# Patient Record
Sex: FEMALE | Race: BLACK OR AFRICAN AMERICAN | NOT HISPANIC OR LATINO | Employment: UNEMPLOYED | URBAN - METROPOLITAN AREA
[De-identification: names, ages, dates, MRNs, and addresses within clinical notes are randomized per-mention and may not be internally consistent; named-entity substitution may affect disease eponyms.]

---

## 2019-01-22 ENCOUNTER — HISTORICAL (OUTPATIENT)
Dept: ADMINISTRATIVE | Facility: HOSPITAL | Age: 54
End: 2019-01-22

## 2022-04-10 ENCOUNTER — HISTORICAL (OUTPATIENT)
Dept: ADMINISTRATIVE | Facility: HOSPITAL | Age: 57
End: 2022-04-10

## 2022-04-28 VITALS
SYSTOLIC BLOOD PRESSURE: 119 MMHG | WEIGHT: 259.94 LBS | BODY MASS INDEX: 47.84 KG/M2 | DIASTOLIC BLOOD PRESSURE: 80 MMHG | HEIGHT: 62 IN

## 2023-01-09 ENCOUNTER — HOSPITAL ENCOUNTER (EMERGENCY)
Facility: HOSPITAL | Age: 58
Discharge: HOME | End: 2023-01-09
Attending: FAMILY MEDICINE
Payer: MEDICAID

## 2023-01-09 VITALS
DIASTOLIC BLOOD PRESSURE: 72 MMHG | HEART RATE: 69 BPM | SYSTOLIC BLOOD PRESSURE: 154 MMHG | WEIGHT: 253.5 LBS | HEIGHT: 64 IN | RESPIRATION RATE: 16 BRPM | BODY MASS INDEX: 43.28 KG/M2 | TEMPERATURE: 98 F | OXYGEN SATURATION: 97 %

## 2023-01-09 DIAGNOSIS — N64.4 BREAST PAIN, RIGHT: ICD-10-CM

## 2023-01-09 PROCEDURE — 93005 ELECTROCARDIOGRAM TRACING: CPT

## 2023-01-09 PROCEDURE — 96372 THER/PROPH/DIAG INJ SC/IM: CPT | Performed by: NURSE PRACTITIONER

## 2023-01-09 PROCEDURE — 99284 EMERGENCY DEPT VISIT MOD MDM: CPT | Mod: 25

## 2023-01-09 PROCEDURE — 63600175 PHARM REV CODE 636 W HCPCS: Performed by: NURSE PRACTITIONER

## 2023-01-09 RX ORDER — KETOROLAC TROMETHAMINE 30 MG/ML
30 INJECTION, SOLUTION INTRAMUSCULAR; INTRAVENOUS
Status: COMPLETED | OUTPATIENT
Start: 2023-01-09 | End: 2023-01-09

## 2023-01-09 RX ORDER — GABAPENTIN 300 MG/1
300 CAPSULE ORAL DAILY
Qty: 14 CAPSULE | Refills: 0 | Status: SHIPPED | OUTPATIENT
Start: 2023-01-09 | End: 2023-01-23

## 2023-01-09 RX ORDER — INDOMETHACIN 25 MG/1
25 CAPSULE ORAL 2 TIMES DAILY PRN
Qty: 14 CAPSULE | Refills: 0 | Status: SHIPPED | OUTPATIENT
Start: 2023-01-09 | End: 2023-01-16

## 2023-01-09 RX ADMIN — KETOROLAC TROMETHAMINE 30 MG: 30 INJECTION, SOLUTION INTRAMUSCULAR; INTRAVENOUS at 03:01

## 2023-01-09 NOTE — DISCHARGE INSTRUCTIONS
Follow up with PCP in 5-7 days for additional evaluation.  Warm compresses to affected areas and soak in Epsom Salt for comfort.  Take pain medication as directed for up to 7 days.  Keep next appointment with PCP to discuss scheduling breast reduction.

## 2023-01-09 NOTE — ED PROVIDER NOTES
"Encounter Date: 2023       History     Chief Complaint   Patient presents with    Breast Pain     Pt reports pain and burning sensation to R. Breast since Friday.     Pt is a 58 y.o. female who presents to the [facility] ED complaining of RT breast pain x 3 days. Describes pain as a "burning" which began after she was no wearing her bra. Pt reports previous flares of similar pain and is currently awaiting an evaluation with her PCP for abreast reduction. Denies chest pain, SOB, weakness, dizziness, fever, changes in breast tissue, redness to affected breast, or drainage from nipple of affected breast. 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    Review of patient's allergies indicates:  No Known Allergies  Past Medical History:   Diagnosis Date    Hypertension     Thyroid disease      Past Surgical History:   Procedure Laterality Date     SECTION      x 2    HYSTERECTOMY       History reviewed. No pertinent family history.  Social History     Tobacco Use    Smoking status: Never    Smokeless tobacco: Never     Review of Systems   Constitutional:  Negative for chills, diaphoresis, fatigue and fever.   HENT:  Negative for facial swelling, rhinorrhea, sinus pressure, sinus pain, sore throat and trouble swallowing.    Respiratory:  Negative for cough, chest tightness, shortness of breath and wheezing.    Cardiovascular:  Positive for chest pain (Rt breast pain). Negative for palpitations and leg swelling.   Gastrointestinal:  Negative for abdominal pain, diarrhea, nausea and vomiting.   Genitourinary:  Negative for dysuria, flank pain, frequency, hematuria and urgency.   Musculoskeletal:  Negative for arthralgias, back pain, joint swelling and myalgias.   Skin:  Negative for color change and rash.   Neurological:  Negative for dizziness, syncope, weakness and light-headedness.   Hematological:  Does not bruise/bleed easily.   All other systems " reviewed and are negative.    Physical Exam     Initial Vitals [01/09/23 1435]   BP Pulse Resp Temp SpO2   (!) 154/72 69 16 98.1 °F (36.7 °C) 97 %      MAP       --         Physical Exam    Nursing note and vitals reviewed.  Constitutional: She appears well-developed and well-nourished.   HENT:   Head: Normocephalic and atraumatic.   Nose: Nose normal.   Mouth/Throat: Oropharynx is clear and moist.   Eyes: Conjunctivae and EOM are normal. Pupils are equal, round, and reactive to light.   Neck: Neck supple.   Normal range of motion.  Cardiovascular:  Normal rate, regular rhythm, normal heart sounds and intact distal pulses.           Pulmonary/Chest: Effort normal and breath sounds normal. No respiratory distress. She has no wheezes. She has no rhonchi. She has no rales. She exhibits no tenderness.   Right breast exhibits tenderness. Right breast exhibits no mass, no nipple discharge and no skin change. No breast swelling. Breasts are symmetrical.   Abdominal: Abdomen is soft and flat. Bowel sounds are normal. She exhibits no distension. There is no abdominal tenderness. There is no rebound, no guarding, no tenderness at McBurney's point and negative Gan's sign. negative psoas sign  Genitourinary: No breast swelling.   Musculoskeletal:         General: Normal range of motion.      Cervical back: Normal range of motion and neck supple.     Neurological: She is alert and oriented to person, place, and time. She has normal strength and normal reflexes.   Skin: Skin is warm and dry. Capillary refill takes less than 2 seconds.   Psychiatric: She has a normal mood and affect. Her speech is normal and behavior is normal. Judgment and thought content normal.       ED Course   Procedures  Labs Reviewed - No data to display     ECG Results              EKG 12-lead (In process)  Result time 01/09/23 14:57:21      In process by Interface, Lab In [facility] (01/09/23 14:57:21)                   Narrative:    Test Reason :  N64.4,    Vent. Rate : 066 BPM     Atrial Rate : 066 BPM     P-R Int : 150 ms          QRS Dur : 068 ms      QT Int : 412 ms       P-R-T Axes : 066 075 020 degrees     QTc Int : 431 ms    Normal sinus rhythm  Right atrial enlargement  Cannot rule out Anterior infarct ,age undetermined  Abnormal ECG  No previous ECGs available    Referred By: [email or web address]   SELF           Confirmed By:                                   Imaging Results              X-Ray Chest PA And Lateral (Final result)  Result time 01/09/23 15:21:48      Final result by Kassidy Cole MD (01/09/23 15:21:48)                   Impression:      No acute abnormality of the chest.      Electronically signed by: Kassidy Cole  Date:    01/09/2023  Time:    15:21               Narrative:    EXAMINATION:  XR CHEST PA AND LATERAL    CLINICAL HISTORY:  Mastodynia    TECHNIQUE:  PA and lateral chest radiographs    COMPARISON:  Chest x-ray dated 10/11/2021    FINDINGS:  The heart is normal in size.  The lungs are clear without focal consolidation.  There is no pleural effusion or pneumothorax.  There are mild degenerative changes of the thoracic spine.                                       Medications   ketorolac injection 30 mg (30 mg Intramuscular Given 1/9/23 1500)     Medical Decision Making:   Differential Diagnosis:   Breast pain  Musculoskeletal pain  Clinical Tests:   Radiological Study: Ordered and Reviewed  Medical Tests: Ordered and Reviewed  ED Management:  3:33 PM Reassessed patient at this time. Reports condition has improved. Discussed with pt that I suspect her pain is secondary to inflammation of upper chest for her breast tissue. Pt is already planning to have a breast reduction from her PCP. Stressed her need to keep next appointment with her PCP as planned but I will place her medication for pain symptoms. Pt will seek evaluation immediately for changes in beast tissue, redness to breasts tissue, or drainage from affected nipple.  Discussed with patient all pertinent ED information and results. Discussed diagnosis and treatment plan with patient. Follow up instructions and return to ED instruction have been given. All questions and concerns were addressed at this time. Patient voices understanding of information and instructions. Patient is comfortable with plan and discharge. Patient is stable for discharge.                           Clinical Impression:   Final diagnoses:  [N64.4] Breast pain, right        ED Disposition Condition    Discharge Stable          ED Prescriptions       Medication Sig Dispense Start Date End Date Auth. Provider    indomethacin (INDOCIN) 25 MG capsule Take 1 capsule (25 mg total) by mouth 2 (two) times daily as needed (pain). 14 capsule 1/9/2023 1/16/2023 HOWARD Cheek Jr.    gabapentin (NEURONTIN) 300 MG capsule Take 1 capsule (300 mg total) by mouth once daily. for 14 days 14 capsule 1/9/2023 1/23/2023 HOWARD Cheek Jr.          Follow-up Information       Follow up With Specialties Details Why Contact Info    [facility] - Emergency Dept Emergency Medicine In 3 days As needed, If symptoms worsen [address] [facility] [id number] [phone]             HOWARD Cheek Jr.  01/09/23 4062

## 2023-01-09 NOTE — Clinical Note
"Karyna"Erika Mcarthur was seen and treated in our emergency department on 1/9/2023.  She may return to work on 01/12/2023.       If you have any questions or concerns, please don't hesitate to call.      Dion Casanova Jr., FNP"